# Patient Record
Sex: FEMALE | Race: WHITE | NOT HISPANIC OR LATINO | URBAN - METROPOLITAN AREA
[De-identification: names, ages, dates, MRNs, and addresses within clinical notes are randomized per-mention and may not be internally consistent; named-entity substitution may affect disease eponyms.]

---

## 2017-01-14 ENCOUNTER — EMERGENCY (EMERGENCY)
Facility: HOSPITAL | Age: 37
LOS: 1 days | Discharge: HOME | End: 2017-01-14
Attending: EMERGENCY MEDICINE | Admitting: EMERGENCY MEDICINE
Payer: COMMERCIAL

## 2017-01-14 VITALS
TEMPERATURE: 100 F | WEIGHT: 139.99 LBS | SYSTOLIC BLOOD PRESSURE: 133 MMHG | RESPIRATION RATE: 18 BRPM | DIASTOLIC BLOOD PRESSURE: 88 MMHG | OXYGEN SATURATION: 98 % | HEART RATE: 110 BPM

## 2017-01-14 VITALS
DIASTOLIC BLOOD PRESSURE: 53 MMHG | OXYGEN SATURATION: 99 % | SYSTOLIC BLOOD PRESSURE: 97 MMHG | RESPIRATION RATE: 18 BRPM | HEART RATE: 93 BPM

## 2017-01-14 LAB
ALBUMIN SERPL ELPH-MCNC: 3.7 G/DL — SIGNIFICANT CHANGE UP (ref 3.4–5)
ALP SERPL-CCNC: 38 U/L — LOW (ref 40–120)
ALT FLD-CCNC: 18 U/L — SIGNIFICANT CHANGE UP (ref 12–42)
ANION GAP SERPL CALC-SCNC: 11 MMOL/L — SIGNIFICANT CHANGE UP (ref 9–16)
AST SERPL-CCNC: 16 U/L — SIGNIFICANT CHANGE UP (ref 15–37)
BASOPHILS NFR BLD AUTO: 0.3 % — SIGNIFICANT CHANGE UP (ref 0–2)
BILIRUB SERPL-MCNC: 0.4 MG/DL — SIGNIFICANT CHANGE UP (ref 0.2–1.2)
BUN SERPL-MCNC: 12 MG/DL — SIGNIFICANT CHANGE UP (ref 7–23)
CALCIUM SERPL-MCNC: 8.8 MG/DL — SIGNIFICANT CHANGE UP (ref 8.5–10.5)
CHLORIDE SERPL-SCNC: 108 MMOL/L — SIGNIFICANT CHANGE UP (ref 96–108)
CO2 SERPL-SCNC: 22 MMOL/L — SIGNIFICANT CHANGE UP (ref 22–31)
CREAT SERPL-MCNC: 0.86 MG/DL — SIGNIFICANT CHANGE UP (ref 0.5–1.3)
GLUCOSE SERPL-MCNC: 91 MG/DL — SIGNIFICANT CHANGE UP (ref 70–99)
HCG SERPL-ACNC: <1 MIU/ML — SIGNIFICANT CHANGE UP
HCT VFR BLD CALC: 38 % — SIGNIFICANT CHANGE UP (ref 34.5–45)
HGB BLD-MCNC: 12.9 G/DL — SIGNIFICANT CHANGE UP (ref 11.5–15.5)
LYMPHOCYTES # BLD AUTO: 7.6 % — LOW (ref 13–44)
MCHC RBC-ENTMCNC: 29.7 PG — SIGNIFICANT CHANGE UP (ref 27–34)
MCHC RBC-ENTMCNC: 33.9 G/DL — SIGNIFICANT CHANGE UP (ref 32–36)
MCV RBC AUTO: 87.6 FL — SIGNIFICANT CHANGE UP (ref 80–100)
MONOCYTES NFR BLD AUTO: 6.6 % — SIGNIFICANT CHANGE UP (ref 2–14)
NEUTROPHILS NFR BLD AUTO: 85.5 % — HIGH (ref 43–77)
PLATELET # BLD AUTO: 203 K/UL — SIGNIFICANT CHANGE UP (ref 150–400)
POTASSIUM SERPL-MCNC: 3.6 MMOL/L — SIGNIFICANT CHANGE UP (ref 3.5–5.3)
POTASSIUM SERPL-SCNC: 3.6 MMOL/L — SIGNIFICANT CHANGE UP (ref 3.5–5.3)
PROT SERPL-MCNC: 7.9 G/DL — SIGNIFICANT CHANGE UP (ref 6.4–8.2)
RBC # BLD: 4.34 M/UL — SIGNIFICANT CHANGE UP (ref 3.8–5.2)
RBC # FLD: 11.9 % — SIGNIFICANT CHANGE UP (ref 10.3–16.9)
SODIUM SERPL-SCNC: 141 MMOL/L — SIGNIFICANT CHANGE UP (ref 135–145)
TSH SERPL-MCNC: 0.19 UIU/ML — LOW (ref 0.35–4.94)
WBC # BLD: 6 K/UL — SIGNIFICANT CHANGE UP (ref 3.8–10.5)
WBC # FLD AUTO: 6 K/UL — SIGNIFICANT CHANGE UP (ref 3.8–10.5)

## 2017-01-14 PROCEDURE — 84702 CHORIONIC GONADOTROPIN TEST: CPT

## 2017-01-14 PROCEDURE — 96374 THER/PROPH/DIAG INJ IV PUSH: CPT

## 2017-01-14 PROCEDURE — 85025 COMPLETE CBC W/AUTO DIFF WBC: CPT

## 2017-01-14 PROCEDURE — 99284 EMERGENCY DEPT VISIT MOD MDM: CPT | Mod: 25

## 2017-01-14 PROCEDURE — 71046 X-RAY EXAM CHEST 2 VIEWS: CPT

## 2017-01-14 PROCEDURE — 80053 COMPREHEN METABOLIC PANEL: CPT

## 2017-01-14 PROCEDURE — 96375 TX/PRO/DX INJ NEW DRUG ADDON: CPT

## 2017-01-14 PROCEDURE — 84443 ASSAY THYROID STIM HORMONE: CPT

## 2017-01-14 PROCEDURE — 71020: CPT | Mod: 26

## 2017-01-14 PROCEDURE — 36415 COLL VENOUS BLD VENIPUNCTURE: CPT

## 2017-01-14 RX ORDER — KETOROLAC TROMETHAMINE 30 MG/ML
30 SYRINGE (ML) INJECTION ONCE
Qty: 0 | Refills: 0 | Status: DISCONTINUED | OUTPATIENT
Start: 2017-01-14 | End: 2017-01-14

## 2017-01-14 RX ORDER — SODIUM CHLORIDE 9 MG/ML
1000 INJECTION INTRAMUSCULAR; INTRAVENOUS; SUBCUTANEOUS ONCE
Qty: 0 | Refills: 0 | Status: COMPLETED | OUTPATIENT
Start: 2017-01-14 | End: 2017-01-14

## 2017-01-14 RX ORDER — ACETAMINOPHEN 500 MG
975 TABLET ORAL ONCE
Qty: 0 | Refills: 0 | Status: COMPLETED | OUTPATIENT
Start: 2017-01-14 | End: 2017-01-14

## 2017-01-14 RX ORDER — ONDANSETRON 8 MG/1
4 TABLET, FILM COATED ORAL ONCE
Qty: 0 | Refills: 0 | Status: COMPLETED | OUTPATIENT
Start: 2017-01-14 | End: 2017-01-14

## 2017-01-14 RX ADMIN — SODIUM CHLORIDE 1333.33 MILLILITER(S): 9 INJECTION INTRAMUSCULAR; INTRAVENOUS; SUBCUTANEOUS at 18:57

## 2017-01-14 RX ADMIN — Medication 975 MILLIGRAM(S): at 19:03

## 2017-01-14 RX ADMIN — Medication 30 MILLIGRAM(S): at 20:06

## 2017-01-14 RX ADMIN — ONDANSETRON 4 MILLIGRAM(S): 8 TABLET, FILM COATED ORAL at 19:03

## 2017-01-14 NOTE — ED ADULT NURSE NOTE - OBJECTIVE STATEMENT
Patient presents to the ED, + for the flu today. Patient reports that she has been coughing for a few days. Took tamiflu today and she threw it up. Reports that she has not been eating and is dehydrated. Patient presents to the ED, + for the flu today. Patient reports that she has been coughing for a few days. Took tamiflu today and she threw it up. Reports that she has not been eating and is dehydrated. Complaining of back pain from coughing.

## 2017-01-14 NOTE — ED ADULT NURSE NOTE - CHIEF COMPLAINT QUOTE
"Im positive for flu at [facility].  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

## 2017-01-14 NOTE — ED ADULT TRIAGE NOTE - CHIEF COMPLAINT QUOTE
"Im positive for flu at [facility].  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

## 2017-01-14 NOTE — ED PROVIDER NOTE - OBJECTIVE STATEMENT
35 yo female with symptoms since Wednesday, nasal congestion, cough lower back pain, myalgias started on doxycycline due to presumed pna 3 days ago also diagnosed with influenza today because she wasn't feeling better took a dose of tamiflu and vomited. Also one episode of blood tinged sputum.  Pt denies chest pain, SOB, abdominal.

## 2017-01-14 NOTE — ED PROVIDER NOTE - MEDICAL DECISION MAKING DETAILS
pt with viral symptoms, fever, nasal congestion, cough with blood tinged sputum on doxycycline and tamiflu (outside of window) with episode of emesis. Requesting IV fluids and blood work. Pt well appearing on exam with clear lungs. Will treat symptoms, labs, repeat CXR.

## 2017-01-18 DIAGNOSIS — M54.5 LOW BACK PAIN: ICD-10-CM

## 2017-01-18 DIAGNOSIS — R05 COUGH: ICD-10-CM

## 2017-01-18 DIAGNOSIS — B34.9 VIRAL INFECTION, UNSPECIFIED: ICD-10-CM

## 2023-04-04 ENCOUNTER — EMERGENCY (EMERGENCY)
Facility: HOSPITAL | Age: 43
LOS: 1 days | Discharge: HOME | End: 2023-04-04
Attending: STUDENT IN AN ORGANIZED HEALTH CARE EDUCATION/TRAINING PROGRAM | Admitting: STUDENT IN AN ORGANIZED HEALTH CARE EDUCATION/TRAINING PROGRAM
Payer: COMMERCIAL

## 2023-04-04 VITALS
SYSTOLIC BLOOD PRESSURE: 149 MMHG | HEART RATE: 59 BPM | DIASTOLIC BLOOD PRESSURE: 92 MMHG | OXYGEN SATURATION: 96 % | TEMPERATURE: 98 F | RESPIRATION RATE: 16 BRPM

## 2023-04-04 VITALS
DIASTOLIC BLOOD PRESSURE: 105 MMHG | TEMPERATURE: 97 F | HEIGHT: 65 IN | WEIGHT: 160.06 LBS | OXYGEN SATURATION: 99 % | RESPIRATION RATE: 16 BRPM | HEART RATE: 78 BPM | SYSTOLIC BLOOD PRESSURE: 155 MMHG

## 2023-04-04 DIAGNOSIS — R59.0 LOCALIZED ENLARGED LYMPH NODES: ICD-10-CM

## 2023-04-04 DIAGNOSIS — Z90.89 ACQUIRED ABSENCE OF OTHER ORGANS: ICD-10-CM

## 2023-04-04 DIAGNOSIS — Z83.49 FAMILY HISTORY OF OTHER ENDOCRINE, NUTRITIONAL AND METABOLIC DISEASES: ICD-10-CM

## 2023-04-04 DIAGNOSIS — Z85.850 PERSONAL HISTORY OF MALIGNANT NEOPLASM OF THYROID: ICD-10-CM

## 2023-04-04 DIAGNOSIS — I10 ESSENTIAL (PRIMARY) HYPERTENSION: ICD-10-CM

## 2023-04-04 DIAGNOSIS — K11.8 OTHER DISEASES OF SALIVARY GLANDS: ICD-10-CM

## 2023-04-04 DIAGNOSIS — Z20.822 CONTACT WITH AND (SUSPECTED) EXPOSURE TO COVID-19: ICD-10-CM

## 2023-04-04 DIAGNOSIS — Z92.3 PERSONAL HISTORY OF IRRADIATION: ICD-10-CM

## 2023-04-04 DIAGNOSIS — R22.1 LOCALIZED SWELLING, MASS AND LUMP, NECK: ICD-10-CM

## 2023-04-04 PROBLEM — C73 MALIGNANT NEOPLASM OF THYROID GLAND: Chronic | Status: ACTIVE | Noted: 2017-01-14

## 2023-04-04 LAB
ANION GAP SERPL CALC-SCNC: 8 MMOL/L — SIGNIFICANT CHANGE UP (ref 5–17)
BASOPHILS # BLD AUTO: 0.05 K/UL — SIGNIFICANT CHANGE UP (ref 0–0.2)
BASOPHILS NFR BLD AUTO: 0.7 % — SIGNIFICANT CHANGE UP (ref 0–2)
BUN SERPL-MCNC: 14 MG/DL — SIGNIFICANT CHANGE UP (ref 7–23)
CALCIUM SERPL-MCNC: 8.6 MG/DL — SIGNIFICANT CHANGE UP (ref 8.4–10.5)
CHLORIDE SERPL-SCNC: 104 MMOL/L — SIGNIFICANT CHANGE UP (ref 96–108)
CO2 SERPL-SCNC: 25 MMOL/L — SIGNIFICANT CHANGE UP (ref 22–31)
CREAT SERPL-MCNC: 0.78 MG/DL — SIGNIFICANT CHANGE UP (ref 0.5–1.3)
EGFR: 98 ML/MIN/1.73M2 — SIGNIFICANT CHANGE UP
EOSINOPHIL # BLD AUTO: 0.07 K/UL — SIGNIFICANT CHANGE UP (ref 0–0.5)
EOSINOPHIL NFR BLD AUTO: 1 % — SIGNIFICANT CHANGE UP (ref 0–6)
GLUCOSE SERPL-MCNC: 94 MG/DL — SIGNIFICANT CHANGE UP (ref 70–99)
HCG SERPL-ACNC: 0 MIU/ML — SIGNIFICANT CHANGE UP
HCT VFR BLD CALC: 37.6 % — SIGNIFICANT CHANGE UP (ref 34.5–45)
HGB BLD-MCNC: 12.8 G/DL — SIGNIFICANT CHANGE UP (ref 11.5–15.5)
IMM GRANULOCYTES NFR BLD AUTO: 0.3 % — SIGNIFICANT CHANGE UP (ref 0–0.9)
LYMPHOCYTES # BLD AUTO: 1.9 K/UL — SIGNIFICANT CHANGE UP (ref 1–3.3)
LYMPHOCYTES # BLD AUTO: 27.1 % — SIGNIFICANT CHANGE UP (ref 13–44)
MCHC RBC-ENTMCNC: 30.8 PG — SIGNIFICANT CHANGE UP (ref 27–34)
MCHC RBC-ENTMCNC: 34 GM/DL — SIGNIFICANT CHANGE UP (ref 32–36)
MCV RBC AUTO: 90.4 FL — SIGNIFICANT CHANGE UP (ref 80–100)
MONOCYTES # BLD AUTO: 0.47 K/UL — SIGNIFICANT CHANGE UP (ref 0–0.9)
MONOCYTES NFR BLD AUTO: 6.7 % — SIGNIFICANT CHANGE UP (ref 2–14)
NEUTROPHILS # BLD AUTO: 4.49 K/UL — SIGNIFICANT CHANGE UP (ref 1.8–7.4)
NEUTROPHILS NFR BLD AUTO: 64.2 % — SIGNIFICANT CHANGE UP (ref 43–77)
NRBC # BLD: 0 /100 WBCS — SIGNIFICANT CHANGE UP (ref 0–0)
PLATELET # BLD AUTO: 289 K/UL — SIGNIFICANT CHANGE UP (ref 150–400)
POTASSIUM SERPL-MCNC: 3.9 MMOL/L — SIGNIFICANT CHANGE UP (ref 3.5–5.3)
POTASSIUM SERPL-SCNC: 3.9 MMOL/L — SIGNIFICANT CHANGE UP (ref 3.5–5.3)
RBC # BLD: 4.16 M/UL — SIGNIFICANT CHANGE UP (ref 3.8–5.2)
RBC # FLD: 12 % — SIGNIFICANT CHANGE UP (ref 10.3–14.5)
SARS-COV-2 RNA SPEC QL NAA+PROBE: NEGATIVE — SIGNIFICANT CHANGE UP
SODIUM SERPL-SCNC: 137 MMOL/L — SIGNIFICANT CHANGE UP (ref 135–145)
WBC # BLD: 7 K/UL — SIGNIFICANT CHANGE UP (ref 3.8–10.5)
WBC # FLD AUTO: 7 K/UL — SIGNIFICANT CHANGE UP (ref 3.8–10.5)

## 2023-04-04 PROCEDURE — 70491 CT SOFT TISSUE NECK W/DYE: CPT | Mod: 26,MG

## 2023-04-04 PROCEDURE — 87635 SARS-COV-2 COVID-19 AMP PRB: CPT

## 2023-04-04 PROCEDURE — 99284 EMERGENCY DEPT VISIT MOD MDM: CPT | Mod: 25

## 2023-04-04 PROCEDURE — 84702 CHORIONIC GONADOTROPIN TEST: CPT

## 2023-04-04 PROCEDURE — 36415 COLL VENOUS BLD VENIPUNCTURE: CPT

## 2023-04-04 PROCEDURE — G1004: CPT

## 2023-04-04 PROCEDURE — 99284 EMERGENCY DEPT VISIT MOD MDM: CPT

## 2023-04-04 PROCEDURE — 80048 BASIC METABOLIC PNL TOTAL CA: CPT

## 2023-04-04 PROCEDURE — 70491 CT SOFT TISSUE NECK W/DYE: CPT | Mod: MG

## 2023-04-04 PROCEDURE — 85025 COMPLETE CBC W/AUTO DIFF WBC: CPT

## 2023-04-04 RX ORDER — SODIUM CHLORIDE 9 MG/ML
1000 INJECTION INTRAMUSCULAR; INTRAVENOUS; SUBCUTANEOUS ONCE
Refills: 0 | Status: COMPLETED | OUTPATIENT
Start: 2023-04-04 | End: 2023-04-04

## 2023-04-04 RX ADMIN — SODIUM CHLORIDE 1000 MILLILITER(S): 9 INJECTION INTRAMUSCULAR; INTRAVENOUS; SUBCUTANEOUS at 14:45

## 2023-04-04 NOTE — ED ADULT TRIAGE NOTE - CHIEF COMPLAINT QUOTE
Pt presents to ED C/O L sided neck lump with tenderness starting Sunday. Sent by PCP for imaging. Pt states, " I had a hx of thyroid CA in 2006 so my doctor wanted me to come get checked out". Pt denies fevers. Hx HTN.

## 2023-04-04 NOTE — ED PROVIDER NOTE - OBJECTIVE STATEMENT
Patient is a 42-year-old female with a history of hypertension, thyroidectomy and radiation for papillary thyroid cancer presents to ED for evaluation of left-sided neck swelling/mass.  Patient states she first noted some mild swelling in the left submandibular region about 3 days ago; she notes the pain more when she swallows but she does not have difficulty swallowing or difficulty breathing.  She has had no fever or chills, no weight loss; she has chronic rhinorrhea which is at baseline.  She spoke to the nurse at her primary care physician's office and they emailed her telling her that she should have some imaging including ultrasound/CT scan and lab work.    The patient went to an urgent care today for this but was told they did not have the proper imaging facilities so she was sent to the emergency department.  She has a history of hypertension but does not take medication for it; she denies having a headache, visual changes, chest or back pain, shortness of breath,  nausea, or other acute hypertensive symptoms.

## 2023-04-04 NOTE — ED ADULT NURSE NOTE - OBJECTIVE STATEMENT
The patient is a 42y Female complaining of L sided neck swelling and pain x3 days, pain worsens with touch. Pmhx thyroid CA 2006, currently on synthroid. High BP noted in triage, pt not currently on BP meds. Pt denies changes in speech, trouble swallowing, SOB, CP, F/C, N/V/D.

## 2023-04-04 NOTE — ED ADULT NURSE NOTE - NSICDXFAMILYHX_GEN_ALL_CORE_FT
FAMILY HISTORY:  Family history of cardiovascular disease, Mother  Family history of cardiovascular disease, Mother  Family history of endocrine and metabolic disease, Father

## 2023-04-04 NOTE — ED PROVIDER NOTE - NS ED ATTENDING STATEMENT MOD
This was a shared visit with the GERALDINE. I reviewed and verified the documentation and independently performed the documented:

## 2023-04-04 NOTE — ED PROVIDER NOTE - NSFOLLOWUPINSTRUCTIONS_ED_ALL_ED_FT
No clear explanation for his submandibular pain was found.  Please follow-up with your primary care doctor tomorrow.    Please speak to the physician please discuss starting a blood pressure medication.    Return to the emergency department for any new or worsening symptoms, or any concerns.  ===================  What is high blood pressure?  High blood pressure is a condition that puts you at risk for heart attack, stroke, and kidney disease. It does not usually cause symptoms. But it can be serious.    When your doctor or nurse tells you your blood pressure, they will say 2 numbers. For instance, your doctor or nurse might say that your blood pressure is "130 over 80." The top number is the pressure inside your arteries when your heart is hiram. The bottom number is the pressure inside your arteries when your heart is relaxed.    "Elevated blood pressure" is a term doctors or nurses use as a warning. People with elevated blood pressure do not yet have high blood pressure. But their blood pressure is not as low as it should be for good health.    Many experts define high, elevated, and normal blood pressure as follows:    ?High – Top number of 130 or above and/or bottom number of 80 or above.    ?Elevated – Top number between 120 and 129 and bottom number of 79 or below.    ?Normal – Top number of 119 or below and bottom number of 79 or below.    This information is also in the table (table 1).    How can I lower my blood pressure?  If your doctor or nurse has prescribed blood pressure medicine, the most important thing you can do is to take it. If it causes side effects, do not just stop taking it. Instead, talk to your doctor or nurse about the problems it causes. They might be able to lower your dose or switch you to another medicine. If cost is a problem, mention that too. They might be able to put you on a less expensive medicine. Taking your blood pressure medicine can keep you from having a heart attack or stroke, and it can save your life!    Can I do anything on my own?  You have a lot of control over your blood pressure. To lower it:    ?Lose weight (if you are overweight)    ?Choose a diet low in fat and rich in fruits, vegetables, and low-fat dairy products    ?Reduce the amount of salt you eat    ?Do something active for at least 30 minutes a day on most days of the week    ?Reduce how much alcohol you drink (if you drink more than 2 alcoholic drinks per day)    It's also a good idea to get a home blood pressure meter. People who check their own blood pressure at home do better at keeping it low and can sometimes even reduce the amount of medicine they take.  =================  What are the symptoms of a high blood pressure emergency?  The symptoms depend on the organ or organs affected. They can include:    ?Blurry vision or other vision changes    ?Headache    ?Nausea or vomiting    ?Confusion    ?Passing out or seizures – Seizures are waves of abnormal electrical activity in the brain that can make people move or behave strangely.    ?Weakness or numbness on 1 side of the body, or in 1 arm or leg    ?Difficulty talking    ?Trouble breathing    ?Chest pain    ?Pain in the upper back or between the shoulders    ?Urine that is brown or bloody    ?Pain in the lower back or 1 the side of the body    Should I see a doctor or nurse?  Yes. Call your doctor or nurse right away if you have any of the symptoms listed above, especially if you know that you have high blood pressure.    Some people check their blood pressure at home using a home blood pressure meter. If you do this, call your doctor if you have 2 or more readings higher than 180 over 120. You should call even if you don't have any other symptoms.

## 2023-04-04 NOTE — ED PROVIDER NOTE - CLINICAL SUMMARY MEDICAL DECISION MAKING FREE TEXT BOX
patient with mild left-sided submandibular  lymphadenopathy.  She is well-appearing, nontoxic, without any airway or respiratory compromise.  She had a thyroidectomy and radiation just a few years ago for thyroid cancer;  clinical suspicion low for a new cancerous lesion, especially given that her cancer was a papillary thyroid cancer.  The  palpated swollen gland is very small, and may be related to other etiologies including respiratory viral illness.  We will check basic lab work and a CT scan to rule out a mass, left message for pt's PCP.

## 2023-04-04 NOTE — ED PROVIDER NOTE - PROGRESS NOTE DETAILS
Message left for Dr. Linda Miguel, awaiting call back. CT results reviewed with patient, no clear explanation for the left-sided submandibular pain.  She says ibuprofen helps and she will continue that for now.  We discussed her elevated blood pressure: She states she has been aware of it since last June.  She has no signs or symptoms of a hypertensive emergency; I recommended that she begin treatment for her blood pressure.   She agrees with this but says she will be contacting her primary care physician tomorrow, and she will discuss starting a blood pressure medication then.    Return precautions were discussed, patient expressed clear understanding.  Stable for discharge.

## 2023-04-04 NOTE — ED PROVIDER NOTE - PHYSICAL EXAMINATION
CONSTITUTIONAL: NAD   SKIN: Normal color and turgor.    HEAD: NC/AT.  EYES: Conjunctiva clear. EOMI. PERRL.    ENT: Airway clear. Normal voice and phonation. Mild left sided submandibular EDSON (apx 1.5-2 cm).  No trismus.  Oropharynx unremarkable without tonsillar swelling, exudates, or other intraoral swelling.  Uvula midline.  Thyroid not palpable.  No swelling in expected region of thyroid.    RESPIRATORY:  Normal work of breathing. Lungs CTAB.  CARDIOVASCULAR:  RRR, S1S2. No M/R/G.      GI:  Abdomen soft, nontender.    MSK: Neck supple.  No LE edema or calf tenderness. No joint swelling or ROM limitation.  NEURO: Alert; CN: grossly intact. Speech clear.  MOORE. Gait steady.

## 2023-04-04 NOTE — ED PROVIDER NOTE - PATIENT PORTAL LINK FT
You can access the FollowMyHealth Patient Portal offered by [facility] by registering at the following website: [email or web address]. By joining [facility]’s FollowMyHealth portal, you will also be able to view your health information using other applications (apps) compatible with our system.

## 2024-02-12 NOTE — ED ADULT NURSE NOTE - IN THE PAST 12 MONTHS HAVE YOU USED DRUGS OTHER THAN THOSE REQUIRED FOR MEDICAL REASON?
Arrives via ems from home with complaints of bleeding to [facility]; bleeding controlled upon arrival. No recent injury or trauma; pt has been receiving wound care at home for wound to E   No

## 2024-11-06 ENCOUNTER — EMERGENCY (EMERGENCY)
Facility: HOSPITAL | Age: 44
LOS: 1 days | Discharge: HOME | End: 2024-11-06
Attending: EMERGENCY MEDICINE | Admitting: EMERGENCY MEDICINE
Payer: COMMERCIAL

## 2024-11-06 VITALS
OXYGEN SATURATION: 98 % | DIASTOLIC BLOOD PRESSURE: 74 MMHG | HEART RATE: 74 BPM | TEMPERATURE: 98 F | RESPIRATION RATE: 16 BRPM | SYSTOLIC BLOOD PRESSURE: 120 MMHG

## 2024-11-06 VITALS
HEART RATE: 72 BPM | RESPIRATION RATE: 18 BRPM | DIASTOLIC BLOOD PRESSURE: 86 MMHG | WEIGHT: 160.06 LBS | TEMPERATURE: 98 F | SYSTOLIC BLOOD PRESSURE: 144 MMHG | HEIGHT: 65 IN

## 2024-11-06 LAB
ANION GAP SERPL CALC-SCNC: 9 MMOL/L — SIGNIFICANT CHANGE UP (ref 5–17)
BASOPHILS # BLD AUTO: 0.05 K/UL — SIGNIFICANT CHANGE UP (ref 0–0.2)
BASOPHILS NFR BLD AUTO: 0.9 % — SIGNIFICANT CHANGE UP (ref 0–2)
BUN SERPL-MCNC: 13 MG/DL — SIGNIFICANT CHANGE UP (ref 7–23)
CALCIUM SERPL-MCNC: 9 MG/DL — SIGNIFICANT CHANGE UP (ref 8.4–10.5)
CHLORIDE SERPL-SCNC: 104 MMOL/L — SIGNIFICANT CHANGE UP (ref 96–108)
CO2 SERPL-SCNC: 25 MMOL/L — SIGNIFICANT CHANGE UP (ref 22–31)
CREAT SERPL-MCNC: 0.74 MG/DL — SIGNIFICANT CHANGE UP (ref 0.5–1.3)
EGFR: 104 ML/MIN/1.73M2 — SIGNIFICANT CHANGE UP
EOSINOPHIL # BLD AUTO: 0.07 K/UL — SIGNIFICANT CHANGE UP (ref 0–0.5)
EOSINOPHIL NFR BLD AUTO: 1.3 % — SIGNIFICANT CHANGE UP (ref 0–6)
GLUCOSE SERPL-MCNC: 91 MG/DL — SIGNIFICANT CHANGE UP (ref 70–99)
HCT VFR BLD CALC: 39.4 % — SIGNIFICANT CHANGE UP (ref 34.5–45)
HGB BLD-MCNC: 12.9 G/DL — SIGNIFICANT CHANGE UP (ref 11.5–15.5)
IMM GRANULOCYTES NFR BLD AUTO: 0.4 % — SIGNIFICANT CHANGE UP (ref 0–0.9)
LYMPHOCYTES # BLD AUTO: 1.59 K/UL — SIGNIFICANT CHANGE UP (ref 1–3.3)
LYMPHOCYTES # BLD AUTO: 28.8 % — SIGNIFICANT CHANGE UP (ref 13–44)
MCHC RBC-ENTMCNC: 29.6 PG — SIGNIFICANT CHANGE UP (ref 27–34)
MCHC RBC-ENTMCNC: 32.7 G/DL — SIGNIFICANT CHANGE UP (ref 32–36)
MCV RBC AUTO: 90.4 FL — SIGNIFICANT CHANGE UP (ref 80–100)
MONOCYTES # BLD AUTO: 0.37 K/UL — SIGNIFICANT CHANGE UP (ref 0–0.9)
MONOCYTES NFR BLD AUTO: 6.7 % — SIGNIFICANT CHANGE UP (ref 2–14)
NEUTROPHILS # BLD AUTO: 3.43 K/UL — SIGNIFICANT CHANGE UP (ref 1.8–7.4)
NEUTROPHILS NFR BLD AUTO: 61.9 % — SIGNIFICANT CHANGE UP (ref 43–77)
NRBC # BLD: 0 /100 WBCS — SIGNIFICANT CHANGE UP (ref 0–0)
PLATELET # BLD AUTO: 359 K/UL — SIGNIFICANT CHANGE UP (ref 150–400)
POTASSIUM SERPL-MCNC: 4.1 MMOL/L — SIGNIFICANT CHANGE UP (ref 3.5–5.3)
POTASSIUM SERPL-SCNC: 4.1 MMOL/L — SIGNIFICANT CHANGE UP (ref 3.5–5.3)
RBC # BLD: 4.36 M/UL — SIGNIFICANT CHANGE UP (ref 3.8–5.2)
RBC # FLD: 12.3 % — SIGNIFICANT CHANGE UP (ref 10.3–14.5)
SODIUM SERPL-SCNC: 138 MMOL/L — SIGNIFICANT CHANGE UP (ref 135–145)
WBC # BLD: 5.53 K/UL — SIGNIFICANT CHANGE UP (ref 3.8–10.5)
WBC # FLD AUTO: 5.53 K/UL — SIGNIFICANT CHANGE UP (ref 3.8–10.5)

## 2024-11-06 PROCEDURE — 70450 CT HEAD/BRAIN W/O DYE: CPT | Mod: 26,59,MC

## 2024-11-06 PROCEDURE — 82962 GLUCOSE BLOOD TEST: CPT

## 2024-11-06 PROCEDURE — 70498 CT ANGIOGRAPHY NECK: CPT | Mod: MC

## 2024-11-06 PROCEDURE — 80048 BASIC METABOLIC PNL TOTAL CA: CPT

## 2024-11-06 PROCEDURE — 70496 CT ANGIOGRAPHY HEAD: CPT | Mod: MC

## 2024-11-06 PROCEDURE — 85025 COMPLETE CBC W/AUTO DIFF WBC: CPT

## 2024-11-06 PROCEDURE — 36415 COLL VENOUS BLD VENIPUNCTURE: CPT

## 2024-11-06 PROCEDURE — 93005 ELECTROCARDIOGRAM TRACING: CPT

## 2024-11-06 PROCEDURE — 70450 CT HEAD/BRAIN W/O DYE: CPT | Mod: MC

## 2024-11-06 PROCEDURE — 99285 EMERGENCY DEPT VISIT HI MDM: CPT | Mod: 25

## 2024-11-06 PROCEDURE — 70496 CT ANGIOGRAPHY HEAD: CPT | Mod: 26,MC

## 2024-11-06 PROCEDURE — 81025 URINE PREGNANCY TEST: CPT

## 2024-11-06 PROCEDURE — 93010 ELECTROCARDIOGRAM REPORT: CPT

## 2024-11-06 PROCEDURE — 70498 CT ANGIOGRAPHY NECK: CPT | Mod: 26,MC

## 2024-11-06 PROCEDURE — 99285 EMERGENCY DEPT VISIT HI MDM: CPT

## 2024-11-06 NOTE — ED PROVIDER NOTE - NSFOLLOWUPINSTRUCTIONS_ED_ALL_ED_FT
Your imaging in ED today was reassuring.  Take your regularly prescribed medications. Return to ED with worsening symptoms or other concerns.  Stay well and feel better.    Paresthesia    WHAT YOU NEED TO KNOW:    Paresthesia is numbness, tingling, or burning. It can happen in any part of your body, but usually occurs in your legs, feet, arms, or hands.    DISCHARGE INSTRUCTIONS:    Return to the emergency department if:    You have severe pain along with numbness and tingling.    Your legs suddenly become cold. You have trouble moving your legs, and they ache.    You have increased weakness in a part of your body.    You have uncontrolled movements.  Contact your healthcare provider or neurologist if:    Your symptoms do not improve.    You have symptoms in more than one part of your body.    You have questions or concerns about your condition or care.  Manage paresthesia:    Protect the area from injury. You may injure or burn yourself if you lose feeling in the area. Be careful when you touch anything that could be hot. Wear sturdy shoes to protect your feet. Ask about other ways to protect yourself.    Go to physical or occupational therapy if directed. Your provider may recommend therapy if you have a condition such as carpal tunnel syndrome. A physical therapist can teach you exercises to help strengthen the area or increase your ability to move it. An occupational therapist can help you find new ways to do your daily activities.    Manage health conditions that can cause paresthesia. Work with your diabetes specialist if you have uncontrolled diabetes. A dietitian or your healthcare provider can help you create a meal plan if you have low vitamin B levels. Your provider can help you manage your health if you have multiple sclerosis or you had a stroke. It is important to manage health conditions to stop paresthesia or prevent it from getting worse.  Follow up with your healthcare provider or neurologist as directed: Your healthcare provider may refer you to a specialist. Write down your questions so you remember to ask them during your visits.

## 2024-11-06 NOTE — ED PROVIDER NOTE - OBJECTIVE STATEMENT
43 y/o f with PMH Of HTN presents to ED with episode of bilateral hand numbness, ? slurred speech and facial droop after having an orgasm day prior.  Denies current symptoms.  States symptoms last several minutes and then resolved. No prior hx of similar symptoms in past.  Never had headache or other neuro symptoms following this episode.  States she couldn't come day prior because was having election party.

## 2024-11-06 NOTE — ED ADULT NURSE NOTE - CHIEF COMPLAINT QUOTE
44 y.o. Female presents to ED for bilateral hand numbness & tingling assoc. with an episode of slurred speech and facial droop that lasted for 5 minutes during intercourse at 1500 yesterday 11/5/24. Hx of HTN endorses medication compliance. Denies current cp, sob, nausea, vomiting, focal weakness, focal numbness/tingling, current slurred speech, dizziness, lightheadedness, vision changes, headache. No visible facial droop. BEFAST negative on arrival. LMP 10/30/24

## 2024-11-06 NOTE — ED ADULT TRIAGE NOTE - CHIEF COMPLAINT QUOTE
44 y.o. Female presents to ED for bilateral hand numbness & tingling assoc. with an episode of slurred speech and facial droop that lasted for 5 minutes during intercourse at 1500. Hx of HTN endorses medication compliance. Denies current cp, sob, nausea, vomiting, focal weakness, current slurred speech. No visible facial droop. BEFAST negative on arrival. LMP 10/30/24 44 y.o. Female presents to ED for bilateral hand numbness & tingling assoc. with an episode of slurred speech and facial droop that lasted for 5 minutes during intercourse at 1500 yesterday 11/5/24. Hx of HTN endorses medication compliance. Denies current cp, sob, nausea, vomiting, focal weakness, focal numbness/tingling, current slurred speech, dizziness, lightheadedness, vision changes, headache. No visible facial droop. BEFAST negative on arrival. LMP 10/30/24

## 2024-11-06 NOTE — ED PROVIDER NOTE - PHYSICAL EXAMINATION
VITAL SIGNS: I have reviewed nursing notes and confirm.  CONSTITUTIONAL: Well-developed; well-nourished; in no acute distress.  SKIN: Agree with RN documentation regarding decubitus evaluation. Remainder of skin exam is warm and dry, no acute rash.  HEAD: Normocephalic; atraumatic.  EYES: PERRL, EOM intact; conjunctiva and sclera clear.  ENT: No nasal discharge; airway clear.  NECK: Supple; non tender.  CARD: S1, S2 normal; no murmurs, gallops, or rubs. Regular rate and rhythm.  RESP: No wheezes, rales or rhonchi.  ABD: Normal bowel sounds; soft; non-distended; non-tender; no hepatosplenomegaly.  EXT: Normal ROM. No clubbing, cyanosis or edema.  LYMPH: No acute cervical adenopathy.  NEURO: Alert & Oriented x 3. CN II-XII intact. No facial droop. Clear speech. MOORE w/ 5/5 strength x 4 ext. Normal sensation. No pronator drift. No dysdidokinesia nor dysmetria. Normal heel-to-shin.  PSYCH: Cooperative, appropriate.

## 2024-11-06 NOTE — ED ADULT NURSE NOTE - NSFALLRISKASMT_ED_ALL_ED_DT
Pain not relieved by Medications/Nausea and vomiting that does not stop/Inability to tolerate liquids or foods Bleeding that does not stop/Swelling that gets worse/Pain not relieved by Medications/Fever greater than (need to indicate Fahrenheit or Celsius)/Wound/Surgical Site with redness, or foul smelling discharge or pus/Nausea and vomiting that does not stop/Unable to urinate/Inability to tolerate liquids or foods 06-Nov-2024 09:00

## 2024-11-06 NOTE — ED ADULT NURSE NOTE - BEFAST EYES
LOS: 2 days   Patient Care Team:  Yasmin Stewart MD as PCP - General    Chief Complaint: NSTEMI (non-ST elevated myocardial infarction)    Subjective      Occasional nausea and anxiety. No chest pain following cath and stent yesterday.    Interval History:     Patient Complaints: nausea  Patient Denies:  Chest pain  History taken from: patient chart    Review of Systems:    Review of Systems   Constitutional: Negative for activity change, appetite change, fatigue and fever.   HENT: Negative for ear pain and sore throat.    Eyes: Negative for pain and visual disturbance.   Respiratory: Negative for cough, chest tightness and shortness of breath.    Cardiovascular: Negative for chest pain and palpitations.   Gastrointestinal: Positive for nausea. Negative for abdominal pain and vomiting.   Endocrine: Negative for cold intolerance and heat intolerance.   Genitourinary: Negative for difficulty urinating and dysuria.   Musculoskeletal: Negative for arthralgias and gait problem.   Skin: Negative for color change and rash.   Neurological: Negative for dizziness, weakness and headaches.   Hematological: Negative for adenopathy. Does not bruise/bleed easily.   Psychiatric/Behavioral: Negative for agitation, confusion and sleep disturbance. The patient is nervous/anxious.        Objective     Vital Signs  Temp:  [96.4 °F (35.8 °C)-97.8 °F (36.6 °C)] 97.6 °F (36.4 °C)  Heart Rate:  [68-84] 69  Resp:  [14-18] 16  BP: ()/(60-95) 92/68  Arterial Line BP: (144-186)/(62-99) 178/78    Physical Exam:   Physical Exam   Constitutional: She is oriented to person, place, and time. She appears well-developed and well-nourished.   HENT:   Head: Normocephalic and atraumatic.   Right Ear: External ear normal.   Left Ear: External ear normal.   Nose: Nose normal.   Mouth/Throat: Oropharynx is clear and moist.   Eyes: Conjunctivae and EOM are normal.   Neck: Normal range of motion. Neck supple.   Cardiovascular: Normal rate, regular  rhythm and normal heart sounds.    Pulmonary/Chest: Effort normal. She has decreased breath sounds in the right upper field, the right middle field, the right lower field, the left upper field, the left middle field and the left lower field. She has no wheezes. She has no rhonchi. She has no rales.   Abdominal: Soft. Bowel sounds are normal. She exhibits no distension. There is no tenderness.   Musculoskeletal: Normal range of motion.   Neurological: She is alert and oriented to person, place, and time.   Skin: Skin is warm and dry.   Psychiatric: She has a normal mood and affect. Her speech is normal and behavior is normal. Cognition and memory are normal.   Nursing note and vitals reviewed.       Results Review:       Lab Results (last 24 hours)     Procedure Component Value Units Date/Time    POC Glucose Fingerstick [[id number]]  (Normal) Collected:  04/05/17 1130    Specimen:  Blood Updated:  04/05/17 1142     Glucose 128 mg/dL       RN NotifiedMeter: [id number]: [id number] SIRENA ESPINOZA       POC Glucose Fingerstick [[id number]]  (Abnormal) Collected:  04/05/17 1711    Specimen:  Blood Updated:  04/05/17 1729     Glucose 174 (H) mg/dL       Sliding Scale AdminMeter: [id number]: [id number] MYA VISHNU       POC Glucose Fingerstick [[id number]]  (Abnormal) Collected:  04/05/17 2123    Specimen:  Blood Updated:  04/05/17 2123     Glucose 131 (A) mg/dL     POC Glucose Fingerstick [[id number]]  (Abnormal) Collected:  04/05/17 2123    Specimen:  Blood Updated:  04/05/17 2136     Glucose 131 (H) mg/dL       RN NotifiedMeter: [id number]: [id number] ALEXUS MELCHOR       Urine Culture [[id number]] Collected:  04/04/17 2200    Specimen:  Urine from Urine, Clean Catch Updated:  04/06/17 0605     Urine Culture Mixed Culture    Narrative:         Specimen contains mixed organisms of questionable pathogenicity which indicates contamination with commensal jimmy.  Further identification is unlikely to  provide clinically useful information.  Suggest recollection.    POC Glucose Fingerstick [[id number]]  (Abnormal) Collected:  04/06/17 0608    Specimen:  Blood Updated:  04/06/17 0608     Glucose 204 (A) mg/dL     POC Glucose Fingerstick [[id number]]  (Abnormal) Collected:  04/06/17 0601    Specimen:  Blood Updated:  04/06/17 0621     Glucose 204 (H) mg/dL       Sliding Scale AdminMeter: [id number]: [id number] ALEXUS MELCHOR       CBC (No Diff) [[id number]]  (Normal) Collected:  04/06/17 0618    Specimen:  Blood Updated:  04/06/17 0650     WBC 6.33 10*3/mm3      RBC 3.94 10*6/mm3      Hemoglobin 12.6 g/dL      Hematocrit 35.1 %      MCV 89.1 fL      MCH 32.0 pg      MCHC 35.9 g/dL      RDW 12.9 %      RDW-SD 41.8 fl      MPV 8.9 fL      Platelets 284 10*3/mm3     Basic Metabolic Panel [[id number]]  (Abnormal) Collected:  04/06/17 0618    Specimen:  Blood Updated:  04/06/17 0732     Glucose 174 (H) mg/dL      BUN 9 mg/dL      Creatinine 0.76 mg/dL      Sodium 138 mmol/L      Potassium 4.2 mmol/L      Chloride 112 (H) mmol/L      CO2 16.0 (L) mmol/L      Calcium 8.6 mg/dL      eGFR Non African Amer 76 mL/min/1.73      BUN/Creatinine Ratio 11.8     Anion Gap 10.0 mmol/L           Medication Review:   Current Facility-Administered Medications   Medication Dose Route Frequency Provider Last Rate Last Dose   • albuterol (PROVENTIL) nebulizer solution 0.083% 2.5 mg/3mL  2.5 mg Nebulization Q4H PRN Yasmin Stewart MD       • aspirin chewable tablet 81 mg  81 mg Oral Daily Yasmin Stewart MD   81 mg at 04/06/17 0818   • atorvastatin (LIPITOR) tablet 20 mg  20 mg Oral Nightly Yasmin Stewart MD   20 mg at 04/05/17 2131   • busPIRone (BUSPAR) tablet 10 mg  10 mg Oral Q12H Yasmin Stewart MD   10 mg at 04/06/17 0814   • carvedilol (COREG) tablet 3.125 mg  3.125 mg Oral BID Yasmin Stewart MD   3.125 mg at 04/05/17 1747   • dextrose (D50W) solution 25 g  25 g Intravenous Q15 Min PRN Yasmin Stweart MD       • dextrose (GLUTOSE) oral  gel 15 g  15 g Oral Q15 Min PRN Yasmin Stewart MD       • enoxaparin (LOVENOX) syringe 40 mg  40 mg Subcutaneous Daily Yasmin Stewart MD   40 mg at 04/06/17 0812   • escitalopram (LEXAPRO) tablet 20 mg  20 mg Oral Daily Yasmin Stewart MD   20 mg at 04/06/17 0813   • gabapentin (NEURONTIN) capsule 1,200 mg  1,200 mg Oral Q8H Yasmin Stewart MD   1,200 mg at 04/06/17 0812   • glipiZIDE (GLUCOTROL) tablet 5 mg  5 mg Oral BID AC Yasmin Stewart MD   5 mg at 04/06/17 0813   • glucagon (human recombinant) (GLUCAGEN DIAGNOSTIC) injection 1 mg  1 mg Subcutaneous Q15 Min PRN Yasmin Stewart MD       • HYDROcodone-acetaminophen (NORCO) 7.5-325 MG per tablet 1 tablet  1 tablet Oral Q6H PRN Sharona Larsen MD   1 tablet at 04/06/17 0505   • insulin aspart (novoLOG) injection 0-7 Units  0-7 Units Subcutaneous 4x Daily AC & at Bedtime Yasmin Stewart MD   3 Units at 04/06/17 0819   • insulin detemir (LEVEMIR) injection 14 Units  14 Units Subcutaneous Nightly Yasmin Stewart MD   14 Units at 04/05/17 2132   • Morphine sulfate (PF) injection 2 mg  2 mg Intravenous Q4H PRN Yasmin Stewart MD   2 mg at 04/06/17 0113   • nicotine (NICODERM CQ) 21 MG/24HR patch 1 patch  1 patch Transdermal Q24H Yasmin Stewart MD   1 patch at 04/05/17 2352   • nitroglycerin (NITROSTAT) ointment 1 inch  1 inch Topical Once Yasmin Stewart MD       • ondansetron (ZOFRAN) tablet 4 mg  4 mg Oral Q6H PRN Yasmin Stewart MD   4 mg at 04/05/17 1152    Or   • ondansetron ODT (ZOFRAN-ODT) disintegrating tablet 4 mg  4 mg Oral Q6H PRN Yasmin Stewart MD        Or   • ondansetron (ZOFRAN) injection 4 mg  4 mg Intravenous Q6H PRN Yasmin Stewart MD   4 mg at 04/06/17 0505   • potassium chloride (MICRO-K) CR capsule 40 mEq  40 mEq Oral Daily Yasmin Stewart MD   40 mEq at 04/06/17 0814   • ranolazine (RANEXA) 12 hr tablet 500 mg  500 mg Oral Q12H Yasmin Stewart MD   500 mg at 04/06/17 0816   • sodium chloride 0.9 % flush 1-10 mL  1-10 mL Intravenous PRN Yasmin Stewart MD        • sodium chloride 0.9 % flush 10 mL  10 mL Intravenous PRN Ramirez Sanches MD   10 mL at 04/04/17 2057   • sodium chloride 0.9 % with KCl 40 mEq/L infusion  50 mL/hr Intravenous Continuous Yasmin Stewart MD 50 mL/hr at 04/06/17 0744 50 mL/hr at 04/06/17 0744   • ticagrelor (BRILINTA) tablet 90 mg  90 mg Oral BID Raheel Martinez MD   90 mg at 04/06/17 0814       Assessment/Plan     Principal Problem:    NSTEMI (non-ST elevated myocardial infarction)  Active Problems:    Essential hypertension, benign    Anxiety    Chest pain at rest    Tobacco dependence syndrome    Diabetes mellitus type 2 in nonobese    Noncompliance with medication regimen    Hypokalemia    Plan    Recovering from cath and stenting following nstemi  Medication non compliance          This document has been electronically signed by Jordon Islas DO on April 6, 2017 10:49 AM       No

## 2024-11-06 NOTE — ED PROVIDER NOTE - CLINICAL SUMMARY MEDICAL DECISION MAKING FREE TEXT BOX
45 y/o f with PMH Of HTN presents to ED with episode of bilateral hand numbness, ? slurred speech and facial droop after having an orgasm day prior.  Denies current symptoms.  States symptoms last several minutes and then resolved. No prior hx of similar symptoms in past.  Never had headache or other neuro symptoms following this episode.  States she couldn't come day prior because was having election party.      VSS  neuro intact  Imaging in ED neg including ct head, cta head and cta neck  Pt aware of findings and follow up and return precautions

## 2024-11-06 NOTE — ED ADULT NURSE NOTE - OBJECTIVE STATEMENT
44 F Phx of thyroid cancer , thyroidectomy on synthroid / presents to ED ambulatory endorsing minh arms  numbness/ tingling , facial droop ,  slurred speech  after having sexual intercourse and lasted x 15 minutes . Upon arrival to Ed today patient sob/cp/ dizziness / hA. BEFAST negative .

## 2024-11-08 DIAGNOSIS — R20.2 PARESTHESIA OF SKIN: ICD-10-CM

## 2024-11-08 DIAGNOSIS — R20.0 ANESTHESIA OF SKIN: ICD-10-CM

## 2024-11-08 DIAGNOSIS — I10 ESSENTIAL (PRIMARY) HYPERTENSION: ICD-10-CM
